# Patient Record
Sex: FEMALE | Race: WHITE
[De-identification: names, ages, dates, MRNs, and addresses within clinical notes are randomized per-mention and may not be internally consistent; named-entity substitution may affect disease eponyms.]

---

## 2019-06-13 ENCOUNTER — HOSPITAL ENCOUNTER (OUTPATIENT)
Dept: HOSPITAL 61 - PCVCIMAG | Age: 74
Discharge: HOME | End: 2019-06-13
Attending: INTERNAL MEDICINE
Payer: MEDICARE

## 2019-06-13 DIAGNOSIS — I10: Primary | ICD-10-CM

## 2019-06-13 DIAGNOSIS — E78.5: ICD-10-CM

## 2019-06-13 DIAGNOSIS — E78.00: ICD-10-CM

## 2019-06-13 PROCEDURE — G0463 HOSPITAL OUTPT CLINIC VISIT: HCPCS

## 2019-06-13 PROCEDURE — 93325 DOPPLER ECHO COLOR FLOW MAPG: CPT

## 2019-06-13 PROCEDURE — 36415 COLL VENOUS BLD VENIPUNCTURE: CPT

## 2019-06-13 PROCEDURE — 80061 LIPID PANEL: CPT

## 2019-06-13 PROCEDURE — 93351 STRESS TTE COMPLETE: CPT

## 2019-06-13 NOTE — PCVCIMAG
--------------- APPROVED REPORT --------------





Study performed:  06/13/2019 14:16:25



Exam:  Stress Echocardiogram

Indication: Hyperlipidemia, Hypertension

Patient Location: Echo lab

Stress Nurse: Chary Marks RN

Room #: 2

Status: routine



Ht: 5 ft 6 in  

HR: 87 bpm      BP: 156/90 mmHg

Rhythm: NSR



Medical History

Medical History: HTN, Hyperlipidemia, No history of CAD

Cardiac Risk Factors: HTN, Hyperlipidemia, FHX of CAD

Previous Cardiac Procedures: none

Pretest Chest Pain Characteristics: No chest pain

Exercise History: Sedentary



Procedure

The patient underwent an Exercise Stress Test using the Mony 

Protocol. Blood pressure, heart rate, and EKG were monitored.

An Echocardiogram was performed by technician in four stages in quad 

fashion.  At peak stress, four selected images were obtained and 

placed side by side with resting images for comparison.



Stress Test Details

Stress Test:  Exercise stress testing was performed using a Mony 

protocol.

HR

Resting HR:            87 bpmMax Heart Rate (APMHR): 148 bpm 

Max HR Achieved:  151 bpmTarget HR (85% APMHR): 125 bpm

% of APMHR:         102

Recovery HR:            100 bpm

HR response to stress: Normal HR response to stress



BP

Resting BP:  156/90 mmHg

Max BP:       190/90 mmHg

Recovery BP:       150/86 mmHg

BP response to stress: hypertensive response to 

stress.

ECG

Resting ECG:  Sinus Rhythm, NSSTT changes

Stress ECG:     Sinus Rhythm, NSSTT changes

ST Change: Upsloping ST depression

Arrhythmia:    Rare PAC,PVC

Recovery ECG: Sinus Rhythm, NSSTT changes

Recovery ST Change: Non-ischemic

Recovery Arrhythmia: None



Clinical

Reason for Termination: Maximal effort

Stress Symptoms: Leg Fatigue

Exercise duration: 6 min 00 sec

Highest Stage Achieved: Stage 2: 2.5 mph at 12% grade. 

Exercise capacity: 7.0 METs

Overall Exercise Capacity for Age: Average

Scale: Sedentary

Angina Score: None

No complications.



Stress ECG Conclusion

The patient exercised according to the MONY protocol for 6:00 mins; 

achieving a work level of 7.0 METS. 

The resting heart rate of 87 bpm randall to a maximum heart rate of 151 

bpm. 

This value represent 102% of the maximal, age-predicted heart rate. 

The resting blood pressure of 156/90 mmHg, randall to a maximum blood 

pressure of 190/90 mmHg. 

The exercise test was stopped due to fatigue.



Pre-Stress Echo

The resting Echocardiogram showed normal left ventricular 

contractility with an estimated Ejection Fraction of about 55-60%. 

Normal wall motion in all segments on baseline images.



Post-Stress Echo

The stress Echocardiogram showed normal left ventricular 

contractility with an estimated Ejection Fraction of about 65-70%. 



Conclusion

Clinical Response:  Non-ischemic

Exercise Capacity:  Average

Stress ECG Response:  Non-ischemic

Stress Echo Images:  Non-ischemic

No clinical, EKG or echocardiographic evidence for ischemia. 

No echocardiographic evidence for exercise induced ischemia.

Normal stress echocardiogram with maximal exercise 

stress.



<Conclusion>

No clinical, EKG or echocardiographic evidence for ischemia. 

No echocardiographic evidence for exercise induced ischemia.

Normal stress echocardiogram with maximal exercise stress.

## 2021-07-08 ENCOUNTER — HOSPITAL ENCOUNTER (OUTPATIENT)
Dept: HOSPITAL 35 - ULTRA | Age: 76
End: 2021-07-08
Attending: FAMILY MEDICINE
Payer: COMMERCIAL

## 2021-07-08 DIAGNOSIS — M79.89: Primary | ICD-10-CM

## 2022-01-27 ENCOUNTER — HOSPITAL ENCOUNTER (OUTPATIENT)
Dept: HOSPITAL 35 - SJCVC | Age: 77
End: 2022-01-27
Attending: INTERNAL MEDICINE
Payer: COMMERCIAL

## 2022-01-27 DIAGNOSIS — Z79.84: ICD-10-CM

## 2022-01-27 DIAGNOSIS — R93.1: Primary | ICD-10-CM

## 2022-01-27 DIAGNOSIS — I10: ICD-10-CM

## 2022-01-27 DIAGNOSIS — Z88.0: ICD-10-CM

## 2022-01-27 DIAGNOSIS — E78.5: ICD-10-CM

## 2022-01-27 DIAGNOSIS — Z82.49: ICD-10-CM

## 2022-01-27 DIAGNOSIS — Z79.899: ICD-10-CM

## 2022-01-27 DIAGNOSIS — E78.00: ICD-10-CM

## 2022-01-27 DIAGNOSIS — I25.10: ICD-10-CM
